# Patient Record
(demographics unavailable — no encounter records)

---

## 2024-11-06 NOTE — DISCUSSION/SUMMARY
[FreeTextEntry1] : discussed anxious mood and possible therapy- mom to check with her insurance if needed find ways to help with stress ie listen to music, go for a run, etccc  Symptoms likely due to viral URI.  Recommend supportive care including antipyretics, fluids, nasal saline followed by nasal suction and use of humidifier. Discussed honey for cough if over age 1. Consider Mucinex for older kids. Return if symptoms worsen or persist.

## 2024-11-12 NOTE — PHYSICAL EXAM
[NL] : moves all extremities x4, warm, well perfused x4 [Conjuctival Injection] : no conjunctival injection [Increased Tearing] : no increased tearing [Discharge] : no discharge [Eyelid Swelling] : no eyelid swelling [FreeTextEntry5] : slight erythema along right lower lash line, mild tenderness to palpation

## 2024-11-12 NOTE — HISTORY OF PRESENT ILLNESS
[de-identified] : Pt reports right eye itchy and "gooey" since last night. No fever reported at home. Normal appetite and voiding reported. [FreeTextEntry6] : Right eye with under eye redness along lash line starting yesterday, slightly itchy Tenderness along lower lash line No discharge, no conjunctival redness, no increased tearing No known trauma to eye

## 2024-11-12 NOTE — DISCUSSION/SUMMARY
[FreeTextEntry1] : Discussed father/patient symptoms likely to be a chalazion or hordeolum forming Advised warm compresses to eye 4 times daily If no improvement or worsening symptoms, will refer to ophthalmologist

## 2024-11-12 NOTE — HISTORY OF PRESENT ILLNESS
[de-identified] : Pt reports right eye itchy and "gooey" since last night. No fever reported at home. Normal appetite and voiding reported. [FreeTextEntry6] : Right eye with under eye redness along lash line starting yesterday, slightly itchy Tenderness along lower lash line No discharge, no conjunctival redness, no increased tearing No known trauma to eye

## 2025-01-11 NOTE — HISTORY OF PRESENT ILLNESS
[de-identified] : upper back and both shoulder pain on/off x 3 years. Pt states right shoulder hurts most after playing field hockey. no known injury. increased fatigue.  [FreeTextEntry6] : Right upper back/shoulder pain for 3 years. Pain travels along inner border of scapula. Pain is getting worse. Worse after field hockey. Denies decreased ROM. No known injury but does carry a heavy backpack.

## 2025-01-11 NOTE — DISCUSSION/SUMMARY
[FreeTextEntry1] : Discussed adequate warm ups and stretching exercises before field hockey. Ibuprofen PRN. Use of warm or cold application. F.u ortho

## 2025-01-30 NOTE — DISCUSSION/SUMMARY
[FreeTextEntry1] : POC strep, flu, covid negative. If throat cx pos treat with amoxicillin 500mg BID x 10 days Recommended OTC therapy with pain/fever control products, topical products (lozenges, sprays, gargles) as needed per manufacturers recommendation.   Topical steroid to axilla as prescribed, keep area clean and dry. F/u in 2 weeks if not improving or sooner if worsening.

## 2025-01-30 NOTE — PHYSICAL EXAM
[Erythematous Oropharynx] : erythematous oropharynx [NL] : moves all extremities x4, warm, well perfused x4 [de-identified] : Left axilla skin erythematous, inflamed

## 2025-01-30 NOTE — HISTORY OF PRESENT ILLNESS
[de-identified] : ST x 2 days, itchy rash on left armpit as per mom. No other c/o.  [FreeTextEntry6] : 2 days of sore throat and congestion, she is afebrile. Itchy rash left axilla for about a week, getting worse.

## 2025-02-03 NOTE — PHYSICAL EXAM
[NL] : no abnormal lymph nodes palpated [de-identified] : PND present [FreeTextEntry7] : clear throughout- no change after cough - good air flow

## 2025-02-03 NOTE — DISCUSSION/SUMMARY
[FreeTextEntry1] : lung exam is normal- patient may benefit from saline nebulizer otc - needs to drink more fluids- recheck prn

## 2025-02-03 NOTE — HISTORY OF PRESENT ILLNESS
[de-identified] : Follow up Cough not improving, mom concerned for walking pneumonia. [FreeTextEntry6] : seen last visit on first day of symptoms- has not had any fever - now has a cough- sounds dry - patient feels hard to breath but not tight feeling in upper chest - feels it in her throat also

## 2025-02-07 NOTE — PHYSICAL EXAM
[TextEntry] : General: alert and active in no apparent distress Eyes: conjunctiva clear, EOMI Ears: TMs translucent bilaterally, normal landmarks noted, normal canals Nose: no rhinorrhea OP: no tonsillar enlargement/exudate, no lesions, no posterior pharyngeal erythema Neck: supple, no adenopathy Lungs: clear to auscultation bilaterally, good air exchange, no retractions, comfortable WOB, + dry cough CVS: Normal rate, regular rhythm, no murmur, +mild tenderness over upper sternal and upper costochondral junctions Abdomen: soft, nondistended, nontender, and no hepatosplenomegaly or masses, normoactive bowel sounds Skin: No rashes, lesions or skin changes

## 2025-02-07 NOTE — PLAN
[TextEntry] : CXR ordered for cough refractory to OTC and RX medications If +, should start Z PACK Can complete 5 day steroid course  - Symptomatic treatment with acetaminophen or ibuprofen prn - costochondritis - Saline nose drops, cool mist humidifier  - Follow up if symptoms are worsening  I spent 31 minutes on pertinent chart review, face to face patient care, counseling and educating the patient/family/caregiver, ordering tests

## 2025-02-07 NOTE — HISTORY OF PRESENT ILLNESS
[de-identified] : Pt is here c/o productive cough and chest congestion, no fever or sinus congestion  [FreeTextEntry6] : Cough x 8 days Pt was seen here on 1/30 and 2/3 for ST, congestion and cough. during these visits, covid/flu/strep are all negative  On 2/4, patient was coughing and choking in the shower, she had difficulty catching her breath she presented to an urgent care on 2/5, where the provider heard wheezing on exam and RX'd prednisone and albuterol  Her cough has not improved, she is "coughing non stop", with chest tightness, her last albuterol tx was 4.5hrs ago + pilar cheeks today + runny nose pt reports "feels tonsils" but not painful She has had no fevers

## 2025-02-12 NOTE — HISTORY OF PRESENT ILLNESS
[de-identified] : had body aches/ body tingling last night cough getting worse with green mucus coming out  [FreeTextEntry6] : Cough and congestion x 1-2 weeks, no fever, no HAs.  Now has body aches x 1 day, still no fevers. No vomiting or diarrhea.  She just had a negative CXR and took a course of steroids. Her cough has improved slightly.  her sister now has a cough.

## 2025-02-12 NOTE — REVIEW OF SYSTEMS
[Fever] : no fever [Malaise] : malaise [Headache] : no headache [Ear Pain] : no ear pain [Nasal Congestion] : nasal congestion [Sore Throat] : no sore throat [Cough] : cough [Shortness of Breath] : no shortness of breath [Myalgia] : myalgia [Negative] : Skin

## 2025-02-22 NOTE — PHYSICAL EXAM
[Inflamed Nasal Mucosa] : inflamed nasal mucosa [NL] : moves all extremities x4, warm, well perfused x4 [de-identified] : tender papules BL armpits R>L

## 2025-02-22 NOTE — HISTORY OF PRESENT ILLNESS
[de-identified] : pt reports feeling bump underneath right armpit. both underarm pits tender. increased fatigue. left upper abdominal pain last night. nonradiating. no v/n/d. patient reports feeling something in throat. Mother requesting BW. [FreeTextEntry6] : Rejicarmen noted bumps under armpits, painful Still with congestion Cough improved but last night had sharp L sided chest pain, not TTP, not a/w exertion, self resolved No fevers recently

## 2025-04-08 NOTE — HISTORY OF PRESENT ILLNESS
[de-identified] : Possible sinus pressure, congestion and bump on bridge of nose for about 1 week [FreeTextEntry6] : Ongoing sinus issues In past had turbinates and adenoids shaved by Dr. Miller Cruz Was then referred by Dr. Cruz to [facility] for deviated nasal septum, saw Dr. Cespedes and Dr. Santos Currently using zyrtec at night, flonase in AM No relief In past no improvement with azelastine Dr. Cespedes did not think she would benefit from surgery

## 2025-04-08 NOTE — DISCUSSION/SUMMARY
[FreeTextEntry1] : - Warm compresses and mupirocin to bridge of nose - Follow up with allergy, consider allergy shots - Will refer for another ENT opinion - Return PRN new or worsening symptoms

## 2025-04-08 NOTE — PHYSICAL EXAM
[Inflamed Nasal Mucosa] : inflamed nasal mucosa [NL] : warm, clear [FreeTextEntry5] : allergic shiners [FreeTextEntry4] : red hard papule bridge of nose

## 2025-04-08 NOTE — HISTORY OF PRESENT ILLNESS
[de-identified] : Possible sinus pressure, congestion and bump on bridge of nose for about 1 week [FreeTextEntry6] : Ongoing sinus issues In past had turbinates and adenoids shaved by Dr. Miller Cruz Was then referred by Dr. Cruz to [facility] for deviated nasal septum, saw Dr. Cespedes and Dr. Santos Currently using zyrtec at night, flonase in AM No relief In past no improvement with azelastine Dr. Cespedes did not think she would benefit from surgery

## 2025-04-14 NOTE — PHYSICAL EXAM
[Clear] : right tympanic membrane clear [Retracted] : retracted [Clear Rhinorrhea] : clear rhinorrhea [Inflamed Nasal Mucosa] : inflamed nasal mucosa [Erythematous Oropharynx] : erythematous oropharynx [Enlarged Tonsils] : enlarged tonsils [NL] : warm, clear

## 2025-04-14 NOTE — HISTORY OF PRESENT ILLNESS
[de-identified] : sore throat, low grade fever, ear pain, sinus pressure since sat [FreeTextEntry6] : - Return for worsening of symptoms - Fever starting Sat, 100.2 already on tylenol/motrin for pain - ST - Ear pain - Worsening sinus pressure - Yellow nasal discharge - Malaise, in bed since Sunday

## 2025-04-14 NOTE — DISCUSSION/SUMMARY
[FreeTextEntry1] : - Strep neg, if throat culture positive start amoxicillin, already ordered.   - Acute worsening of sinus pressure, HA, nasal congestion.  History of recurrent sinusitis.  Discussed can wait a few days but if not improving to start antibiotic therapy.   - Already has ENT referral - Return PRN new or worsening symptoms

## 2025-07-25 NOTE — PLAN
[TextEntry] : 1) PT and ortho referral Motrin and heat Activity as tolerated Rest with pain  2) Bony prominence of nasal bridge Continue to monitor  3) Use Nizoral 3-4 times a week Leave on for 10-15 minutes before rinsing off Continue for a month If no improvement, RTO  Return PRN  I spent 31 minutes on face to face patient care, counseling and educating the patient/family/caregiver, and referring to external providers.

## 2025-07-25 NOTE — PHYSICAL EXAM
[TextEntry] : General: alert and active in no apparent distress  Nose: Bony prominence on anterior nasal bridge Skin: Mid- posterior hairline with a patch of mild erythema and flakiness, no alopecia MSK: TTP and muscle spasm of superior right scapula. BL shoulders FROM Spine:

## 2025-07-25 NOTE — HISTORY OF PRESENT ILLNESS
[de-identified] : 16yr old f right shoulder pain for a few months  [FreeTextEntry6] : 1) Right scapula pain x few months No radiation of pain No weakness, numbness or tingling Pain exacerbated by rolling back the shoulders and sometimes with walking no prior injuries right shoulder is not painful tried icing and heat without relief very active with field hockey and carries heavy backpack  2) Bump on nose for a few months Not painful No injuries  3) flakiness and redness posterior hairline a little itchy mom bought Nizoral shampoo for patient, but patient has not been using it